# Patient Record
Sex: MALE | Race: WHITE | URBAN - METROPOLITAN AREA
[De-identification: names, ages, dates, MRNs, and addresses within clinical notes are randomized per-mention and may not be internally consistent; named-entity substitution may affect disease eponyms.]

---

## 2017-11-20 ENCOUNTER — OFFICE VISIT (OUTPATIENT)
Dept: INTERNAL MEDICINE CLINIC | Age: 22
End: 2017-11-20

## 2017-11-20 VITALS
BODY MASS INDEX: 25.56 KG/M2 | RESPIRATION RATE: 16 BRPM | TEMPERATURE: 98.4 F | SYSTOLIC BLOOD PRESSURE: 108 MMHG | DIASTOLIC BLOOD PRESSURE: 70 MMHG | WEIGHT: 172.6 LBS | HEIGHT: 69 IN | HEART RATE: 51 BPM | OXYGEN SATURATION: 100 %

## 2017-11-20 DIAGNOSIS — M79.672 LEFT FOOT PAIN: Primary | ICD-10-CM

## 2017-11-20 RX ORDER — FLUTICASONE PROPIONATE 50 MCG
2 SPRAY, SUSPENSION (ML) NASAL DAILY
COMMUNITY

## 2017-11-20 NOTE — PROGRESS NOTES
Chief Complaint   Patient presents with    Foot Pain     he is a 23y.o. year old male who presents for evaluation of Left Foot Pain  Pain Assessment Encounter      Nicolle Mckenzie  11/20/2017  Onset of Symptoms: Several Weeks  ________________________________________________________________________  Description:Ache    Frequency: more than 5 times a day  Pain Scale:(1-10): 3  Trauma Hx: sports related trauma, hockey  Hx of similar symptoms: No  Radiation: None  Duration:  continuous      Progression: has improved  What makes it better?: rest  What makes it worse?:exercise  Medications tried: None    Reviewed and agree with Nurse Note and duplicated in this note. Reviewed PmHx, RxHx, FmHx, SocHx, AllgHx and updated and dated in the chart. History reviewed. No pertinent family history. History reviewed. No pertinent past medical history.    Social History     Social History    Marital status: SINGLE     Spouse name: N/A    Number of children: N/A    Years of education: N/A     Social History Main Topics    Smoking status: Never Smoker    Smokeless tobacco: Never Used    Alcohol use Yes      Comment: Socially    Drug use: None    Sexual activity: Not Asked     Other Topics Concern    None     Social History Narrative    None        Review of Systems - negative except as listed above      Objective:     Vitals:    11/20/17 1538   BP: 108/70   Pulse: (!) 51   Resp: 16   Temp: 98.4 °F (36.9 °C)   TempSrc: Oral   SpO2: 100%   Weight: 172 lb 9.6 oz (78.3 kg)   Height: 5' 9\" (1.753 m)       Physical Examination: General appearance - alert, well appearing, and in no distress  Back exam - full range of motion, no tenderness, palpable spasm or pain on motion  Neurological - alert, oriented, normal speech, no focal findings or movement disorder noted  Musculoskeletal - Left foot -pain with palpation of the navicular tuberosity, no ecchymosis or swelling noted, strength 5 out of 5 plantar and dorsiflexion, free range of motion  Extremities - peripheral pulses normal, no pedal edema, no clubbing or cyanosis  Skin - normal coloration and turgor, no rashes, no suspicious skin lesions noted    Assessment/ Plan:   Diagnoses and all orders for this visit:    1. Left foot pain  -     XR FOOT LT MIN 3 V; Future    Likely bony contusion  Continue with ice and rest from sports  Pathophysiology, recovery and rehabilitation process discussed and questions answered   Counseling for 30 Minutes of the total visit duration   Pictures and figures used as necessary   Provided reassurance   Monitor response to Physical Therapy   Recommend activity modification   Recommend  lower impact activities-walking, Eliptical, Nordic Track, cycling or swimming   Follow up in 4 week(s)  Xray's reviewed - within normal limits         1) Remember to stay active and/or exercise regularly (I suggest 30-45 minutes daily)   2) For reliable dietary information, go to www. [employer].org. You may wish to consider seeing the nutritionist at [facility] [phone], also consider the [id number] I have discussed the diagnosis with the patient and the intended plan as seen in the above orders. The patient has received an after-visit summary and questions were answered concerning future plans. Medication Side Effects and Warnings were discussed with patient: yes  Patient Labs were reviewed and or requested: yes  Patient Past Records were reviewed and or requested  yes  I have discussed the diagnosis with the patient and the intended plan as seen in the above orders. The patient has received an after-visit summary and questions were answered concerning future plans. Pt agrees to call or return to clinic and/or go to closest ER with any worsening of symptoms. This may include, but not limited to increased fever (>100.4) with NSAIDS or Tylenol, increased edema, confusion, rash, worsening of presenting symptoms.